# Patient Record
Sex: MALE | Race: WHITE | URBAN - METROPOLITAN AREA
[De-identification: names, ages, dates, MRNs, and addresses within clinical notes are randomized per-mention and may not be internally consistent; named-entity substitution may affect disease eponyms.]

---

## 2019-07-15 ENCOUNTER — OFFICE VISIT (OUTPATIENT)
Dept: ORTHOPEDIC SURGERY | Age: 34
End: 2019-07-15
Payer: COMMERCIAL

## 2019-07-15 VITALS — WEIGHT: 230 LBS | BODY MASS INDEX: 32.2 KG/M2 | HEIGHT: 71 IN

## 2019-07-15 DIAGNOSIS — G56.23 CUBITAL TUNNEL SYNDROME OF BOTH UPPER EXTREMITIES: Primary | ICD-10-CM

## 2019-07-15 DIAGNOSIS — M25.521 PAIN OF BOTH ELBOWS: ICD-10-CM

## 2019-07-15 DIAGNOSIS — M25.522 PAIN OF BOTH ELBOWS: ICD-10-CM

## 2019-07-15 PROCEDURE — 99203 OFFICE O/P NEW LOW 30 MIN: CPT | Performed by: ORTHOPAEDIC SURGERY

## 2019-07-15 PROCEDURE — G8427 DOCREV CUR MEDS BY ELIG CLIN: HCPCS | Performed by: ORTHOPAEDIC SURGERY

## 2019-07-15 PROCEDURE — G8417 CALC BMI ABV UP PARAM F/U: HCPCS | Performed by: ORTHOPAEDIC SURGERY

## 2019-07-15 PROCEDURE — 1036F TOBACCO NON-USER: CPT | Performed by: ORTHOPAEDIC SURGERY

## 2019-07-15 NOTE — LETTER
7/15/2019    No referring provider defined for this encounter. RE: Theresa Hooker    Dear Dr. Crecencio Galeazzi ref. provider found,    Thank you for allowing me to participate in the care of Mr. Ledezma. I had the opportunity to evaluate the patient on 7/15/2019. Attached you will find my evaluation and recommendations. Thanks again for the confidence you have expressed in me by allowing my participation in the care of your patient. I will keep you apprised of further developments in the patients treatment course as it progresses. If I can be of further assistance in any fashion, please feel free to contact me at your convenience.     Sincerely,        Jose Mcnair  Shoulder and Elbow Surgery

## 2019-07-15 NOTE — PROGRESS NOTES
Supination  75  Supinaton  75   Pronation  60  Pronation  70     Crepitation  No  Crepitation  No    Muscle strength:    Right       Left    Biceps   5    Biceps   5  Triceps  5    Triceps  5  Wrist flexion  5    Wrist flexion  5  Wrist extension 5    Wrist extension 5  Intrinsics  5    Intrinsics  5    Tenderness: None     Tenderness: None    Special tests    Right    Ulnar Nerve     Left  y    Cubital tunnel bend    y  y    Tinnel's at elbow    y        Biceps  n    Bicipital tenderness    n  n    Defect at biceps insertion   n        Instability  n    LCL instability     n  n    MCL instability     n  n    Moving valgus test    n  n    Milk sign     n  n    PLRI pivot     n        Epicondylitis  n    Resisted WE Pain    n  n    Resisted WF Pain    n  n    Resisted Supination Pain   n  n    Resisted Pronation Pain   n        Arthritis  n    Clunk      n  n    Pain at extremes of motion   n  n    Pain during arc of motion   n      Imaging:  Xrays: 3 views of the right elbow obtained on 7/15/19 were independently reviewed  Indications: Right elbow pain  Findings: Well-preserved joint spaces. Mild anterior and posterior osteophytes as well as osteophytes involving the radial head. Impression: Right elbow with mild degenerative changes. Xrays: 3 views of the left elbow obtained on 7/15/19 were independently reviewed  Indications: Left elbow pain  Findings: Well-preserved joint spaces. Mild anterior and posterior osteophytes as well as osteophytes involving the radial head. Impression: Left elbow with mild degenerative changes. Impression/Plan:     Eden Franz is a 26 y.o. old male with bilateral elbow and arm pain and paresthesias that at this time appear to be consistent with bilateral carpal and cubital tunnel syndrome. 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 I had a discussion with the patient regarding the nature and extent of his problems.   At this time I would recommend additional